# Patient Record
Sex: FEMALE | Race: BLACK OR AFRICAN AMERICAN | NOT HISPANIC OR LATINO | Employment: FULL TIME | URBAN - METROPOLITAN AREA
[De-identification: names, ages, dates, MRNs, and addresses within clinical notes are randomized per-mention and may not be internally consistent; named-entity substitution may affect disease eponyms.]

---

## 2020-02-24 PROBLEM — M54.6 ACUTE RIGHT-SIDED THORACIC BACK PAIN: Status: ACTIVE | Noted: 2020-02-24

## 2020-02-24 PROBLEM — D64.9 SYMPTOMATIC ANEMIA: Status: ACTIVE | Noted: 2020-02-24

## 2020-02-24 PROBLEM — R80.0 ISOLATED PROTEINURIA: Status: ACTIVE | Noted: 2020-02-24

## 2020-02-24 PROBLEM — L98.9 GROUPED SKIN LESIONS: Status: ACTIVE | Noted: 2020-02-24

## 2020-03-05 PROBLEM — R76.8 POSITIVE ANA (ANTINUCLEAR ANTIBODY): Status: ACTIVE | Noted: 2020-03-05

## 2020-03-05 PROBLEM — E66.9 OBESITY (BMI 30.0-34.9): Status: ACTIVE | Noted: 2020-03-05

## 2020-03-05 PROBLEM — E55.9 VITAMIN D DEFICIENCY: Status: ACTIVE | Noted: 2020-03-05

## 2020-03-05 PROBLEM — E88.09 HYPOALBUMINEMIA: Status: ACTIVE | Noted: 2020-03-05

## 2020-03-05 PROBLEM — M32.14 LUPUS NEPHRITIS: Status: ACTIVE | Noted: 2020-03-05

## 2020-03-05 PROBLEM — E87.6 HYPOKALEMIA: Status: ACTIVE | Noted: 2020-03-05

## 2020-03-05 PROBLEM — R80.9 NEPHROTIC RANGE PROTEINURIA: Status: ACTIVE | Noted: 2020-02-24

## 2020-03-05 PROBLEM — N17.9 AKI (ACUTE KIDNEY INJURY): Status: ACTIVE | Noted: 2020-03-05

## 2020-03-05 PROBLEM — E66.811 OBESITY (BMI 30.0-34.9): Status: ACTIVE | Noted: 2020-03-05

## 2020-03-05 PROBLEM — M32.14 LUPUS NEPHRITIS: Status: RESOLVED | Noted: 2020-03-05 | Resolved: 2020-03-05

## 2020-03-05 PROBLEM — I73.00 RAYNAUD'S PHENOMENON: Status: ACTIVE | Noted: 2020-03-05

## 2020-03-07 PROBLEM — E88.09 HYPOALBUMINEMIA: Status: RESOLVED | Noted: 2020-03-05 | Resolved: 2020-03-07

## 2020-03-07 PROBLEM — N17.9 AKI (ACUTE KIDNEY INJURY): Status: RESOLVED | Noted: 2020-03-05 | Resolved: 2020-03-07

## 2020-03-08 PROBLEM — R59.0 CERVICAL ADENOPATHY: Status: ACTIVE | Noted: 2020-03-08

## 2020-09-22 PROBLEM — E66.01 CLASS 3 SEVERE OBESITY DUE TO EXCESS CALORIES WITH BODY MASS INDEX (BMI) OF 40.0 TO 44.9 IN ADULT: Status: ACTIVE | Noted: 2020-03-05

## 2020-09-22 PROBLEM — E66.813 CLASS 3 SEVERE OBESITY DUE TO EXCESS CALORIES WITH BODY MASS INDEX (BMI) OF 40.0 TO 44.9 IN ADULT: Status: ACTIVE | Noted: 2020-03-05

## 2020-12-13 ENCOUNTER — SPECIALTY PHARMACY (OUTPATIENT)
Dept: PHARMACY | Facility: CLINIC | Age: 35
End: 2020-12-13

## 2020-12-23 NOTE — TELEPHONE ENCOUNTER
Informed Patient that [facility] received prescription for Benlysta and prior authorization is approved. [facility] will be back in touch once after completing a benefits investigation.

## 2021-01-12 NOTE — TELEPHONE ENCOUNTER
Specialty Pharmacy - Initial Clinical Assessment  Specialty Pharmacy - Medication/Referral Authorization    Specialty Medication Orders Linked to Encounter      Most Recent Value   Medication #1  belimumab (BENLYSTA) 200 mg/mL Syrg (Order#[id number], Rx#)        Sisi Bartlett is a 35 y.o. female, who is followed by the specialty pharmacy service for management and education.    Recent Encounters     Date Type Provider Description    12/13/2020 Specialty Pharmacy Delia Cota, Adonis Initial Clinical Assessment; Referral Authorization        Clinical call attempts since last clinical assessment   No call attempts found.     Today she received education before her first fill with [facility].    Current Outpatient Medications   Medication Sig    apixaban (ELIQUIS) 2.5 mg Tab Take 1 tablet (2.5 mg total) by mouth 2 (two) times daily.    atorvastatin (LIPITOR) 40 MG tablet Take 1 tablet (40 mg total) by mouth once daily.    belimumab (BENLYSTA) 200 mg/mL Syrg Inject 1 mL (200 mg total) into the skin every 7 days.    ergocalciferol (ERGOCALCIFEROL) 50,000 unit Cap Take 1 capsule (50,000 Units total) by mouth every 7 days.    ferrous sulfate (FEOSOL) 325 mg (65 mg iron) Tab tablet Take 1 tablet (325 mg total) by mouth 2 (two) times daily.    furosemide (LASIX) 20 MG tablet Take 1 tablet (20 mg total) by mouth daily as needed.    gabapentin (NEURONTIN) 300 MG capsule Take 1 capsule (300 mg total) by mouth every evening.    hydrOXYchloroQUINE (PLAQUENIL) 200 mg tablet Take 1 tablet (200 mg total) by mouth 2 (two) times daily.    iron,carb/vit C/vit B12/folic (IRON 100 PLUS ORAL) Take by mouth.    lisinopriL 10 MG tablet Take 1 tablet (10 mg total) by mouth once daily.    mycophenolate (CELLCEPT) 500 mg Tab Take 3 tablets (1,500 mg total) by mouth 2 (two) times daily.    pantoprazole (PROTONIX) 40 MG tablet Take 1 tablet (40 mg total) by mouth once daily.    polyethylene glycol  (GLYCOLAX) 17 gram PwPk Take 17 g by mouth daily as needed.    polyethylene glycol (GLYCOLAX) 17 gram PwPk Take 17 g by mouth once daily.    predniSONE (DELTASONE) 20 MG tablet Take 2 tablets (40 mg total) by mouth once daily.    simethicone (MYLICON) 125 MG chewable tablet Take 1 tablet (125 mg total) by mouth every 6 (six) hours as needed for Flatulence.    varicella-zoster gE-AS01B, PF, (SHINGRIX, PF,) 50 mcg/0.5 mL injection Inject 0.5 mLs into the muscle in 2 dose series at Day 0 and then next dose within 6 months   Last reviewed on 1/12/2021 10:30 AM by Alex Mcpherson, PharmD    Review of patient's allergies indicates:  No Known AllergiesLast reviewed on  1/12/2021 10:29 AM by Alex Mcpherson    Drug Interactions    Drug interactions evaluated: yes  Clinically relevant drug interactions identified: no  Provided the patient with educational material regarding drug interactions: not applicable           Assessment Questions - Documented Responses      Most Recent Value   Assessment   Medication Reconciliation completed for patient  Yes   During the past 4 weeks, has patient missed any activities due to condition or medication?  No   During the past 4 weeks, did patient have any of the following urgent care visits?  None   Goals of Therapy Status  Discussed (new start)   Welcome packet contents reviewed and discussed with patient?  Yes   Assesment completed?  Yes   Plan  Therapy being initiated   Do you need to open a clinical intervention (i-vent)?  No   Do you want to schedule first shipment?  Yes   Medication #1 Assessment Info   Patient status  New medication, New to [facility]   Is this medication appropriate for the patient?  Yes   Is this medication effective?  Not yet started        Refill Questions - Documented Responses      Most Recent Value   Relationship to patient of person spoken to?  Self   HIPAA/medical authority confirmed?  Yes   Can the patient store medication/sharps container properly (at the  "correct temperature, away from children/pets, etc.)?  Yes   Can the patient call emergency services (911) in the event of an emergency?  Yes   Does the patient have any concerns or questions about taking or administering this medication as prescribed?  No   How many doses does the patient have on hand?  0   How many days does the patient report on hand quantity will last?  1   During the past 4 weeks, has patient missed any activities due to condition or medication?  No   During the past 4 weeks, did patient have any of the following urgent care visits?  None   How will the patient receive the medication?  Mail   When does the patient need to receive the medication?  01/13/21   Shipping Address  Home   Address in [facility] confirmed and updated if neccessary?  Yes   Expected Copay ($)  0   Is the patient able to afford the medication copay?  Yes   Payment Method  zero copay   Days supply of Refill  28   Refill activity completed?  Yes   Refill activity plan  Refill scheduled   Shipment/Pickup Date:  01/12/21          Objective    She has a past medical history of Childhood asthma, Hypoalbuminemia (3/5/2020), Hypokalemia, Iron deficiency anemia, Nephrotic range proteinuria, Nephrotic syndrome, Obesity, Class III, BMI 40-49.9 (morbid obesity), Positive HEAVENLY (antinuclear antibody), SLE (systemic lupus erythematosus), and Vitamin D deficiency.    Tried/failed medications: Cellcept, Prednisone, and Plaquenil    BP Readings from Last 4 Encounters:   12/10/20 123/75   09/22/20 121/72   07/30/20 (!) 107/55   06/18/20 129/67     Ht Readings from Last 4 Encounters:   12/10/20 5' 7" (1.702 m)   09/22/20 5' 7" (1.702 m)   07/30/20 5' 7" (1.702 m)   06/18/20 5' 7" (1.702 m)     Wt Readings from Last 4 Encounters:   12/10/20 117 kg (258 lb)   09/22/20 120.4 kg (265 lb 6.4 oz)   07/30/20 113.4 kg (250 lb)   06/18/20 110.3 kg (243 lb 3.2 oz)     Recent Labs   Lab Result Units 12/10/20  1022   Creatinine mg/dL 0.61   ALT U/L " 30   AST U/L 25     The goals of prescribed drug therapy management include:  · Supporting patient to meet the prescriber's medical treatment objectives  · Improving or maintaining quality of life  · Maintaining optimal therapy adherence  · Minimizing and managing side effects      Goals of Therapy Status: Discussed (new start)    Assessment/Plan  Patient plans to start therapy on 21      Indication, dosage, appropriateness, effectiveness, safety and convenience of her specialty medication(s) were reviewed today.     Patient Counseling    Counseled the patient on the following: doses and administration discussed, safe handling, storage, and disposal discussed, possible adverse effects and management discussed, possible drug and prescription drug interactions discussed, possible drug and OTC drug and food interactions discussed, lab monitoring and follow-up discussed, therapeutic rationale discussed, cost of medications and cost implications discussed, adherence and missed doses discussed, pharmacy contact information discussed        and address verified. Patient confirmed readiness to begin therapy and will start on  after having the first injection with a nurse from her [facility]. Shipment arranged in [facility]. Patient denies any SLE symptoms with her current regimen, but she is experiencing several side effects related to Cellcept and long term Prednisone. She hopes to be able to taper off other medications if Benlysta works. No missed work days, no missed planned events, and no recent trips to urgent care/ED. Counseled patient on administration (including aspetic technique and rotating injection sites) disposal, goals of therapy, common/serious side effects, how to handle side effects, and how to handle missed doses. Reviewed avoiding live vaccines and sharps container disposal. Recommended holding doses if sick and reporting any missed doses or new medications to [facility].  Stressed the importance of routine lab  work and adherence to dosing schedule. Patient plans to utilize a calendar to keep track of dosing days. Reviewed allergies/med list; no changes. No DDIs. Answered patient's questions and concerns. [facility] to call for refills.     Tasks added this encounter   2/2/2021 - Refill Call (Auto Added)  4/5/2021 - Clinical - Follow Up Assesement (90 day)   Tasks due within next 3 months   No tasks due.     Alex Mcpherson, PharmD  [facility]  [address] [place] [id number]  Phone: [phone]  Fax: [phone]

## 2021-02-03 ENCOUNTER — SPECIALTY PHARMACY (OUTPATIENT)
Dept: PHARMACY | Facility: CLINIC | Age: 36
End: 2021-02-03

## 2021-02-25 ENCOUNTER — SPECIALTY PHARMACY (OUTPATIENT)
Dept: PHARMACY | Facility: CLINIC | Age: 36
End: 2021-02-25

## 2021-03-06 ENCOUNTER — PATIENT MESSAGE (OUTPATIENT)
Dept: PHARMACY | Facility: CLINIC | Age: 36
End: 2021-03-06

## 2021-04-01 ENCOUNTER — SPECIALTY PHARMACY (OUTPATIENT)
Dept: PHARMACY | Facility: CLINIC | Age: 36
End: 2021-04-01

## 2021-04-20 PROBLEM — D63.1 ANEMIA OF CHRONIC RENAL FAILURE, STAGE 2 (MILD): Status: ACTIVE | Noted: 2021-04-20

## 2021-04-20 PROBLEM — N18.2 ANEMIA OF CHRONIC RENAL FAILURE, STAGE 2 (MILD): Status: ACTIVE | Noted: 2021-04-20

## 2021-04-20 PROBLEM — N18.2 CKD (CHRONIC KIDNEY DISEASE) STAGE 2, GFR 60-89 ML/MIN: Status: ACTIVE | Noted: 2021-04-20

## 2021-04-29 ENCOUNTER — SPECIALTY PHARMACY (OUTPATIENT)
Dept: PHARMACY | Facility: CLINIC | Age: 36
End: 2021-04-29

## 2021-06-01 ENCOUNTER — SPECIALTY PHARMACY (OUTPATIENT)
Dept: PHARMACY | Facility: CLINIC | Age: 36
End: 2021-06-01

## 2021-06-25 ENCOUNTER — SPECIALTY PHARMACY (OUTPATIENT)
Dept: PHARMACY | Facility: CLINIC | Age: 36
End: 2021-06-25

## 2021-07-19 ENCOUNTER — SPECIALTY PHARMACY (OUTPATIENT)
Dept: PHARMACY | Facility: CLINIC | Age: 36
End: 2021-07-19

## 2021-07-21 ENCOUNTER — SPECIALTY PHARMACY (OUTPATIENT)
Dept: PHARMACY | Facility: CLINIC | Age: 36
End: 2021-07-21

## 2021-07-29 PROBLEM — H40.1132 PRIMARY OPEN ANGLE GLAUCOMA (POAG) OF BOTH EYES, MODERATE STAGE: Status: ACTIVE | Noted: 2021-07-29

## 2021-07-29 PROBLEM — Z79.899 LONG-TERM USE OF PLAQUENIL: Status: ACTIVE | Noted: 2021-07-29

## 2021-08-18 ENCOUNTER — SPECIALTY PHARMACY (OUTPATIENT)
Dept: PHARMACY | Facility: CLINIC | Age: 36
End: 2021-08-18

## 2021-09-17 ENCOUNTER — SPECIALTY PHARMACY (OUTPATIENT)
Dept: PHARMACY | Facility: CLINIC | Age: 36
End: 2021-09-17

## 2021-10-11 ENCOUNTER — SPECIALTY PHARMACY (OUTPATIENT)
Dept: PHARMACY | Facility: CLINIC | Age: 36
End: 2021-10-11

## 2021-11-11 ENCOUNTER — SPECIALTY PHARMACY (OUTPATIENT)
Dept: PHARMACY | Facility: CLINIC | Age: 36
End: 2021-11-11

## 2022-08-19 ENCOUNTER — SPECIALTY PHARMACY (OUTPATIENT)
Dept: PHARMACY | Facility: CLINIC | Age: 37
End: 2022-08-19

## 2022-08-19 ENCOUNTER — PATIENT MESSAGE (OUTPATIENT)
Dept: PHARMACY | Facility: CLINIC | Age: 37
End: 2022-08-19

## 2022-08-19 DIAGNOSIS — M32.14 LUPUS NEPHRITIS, ISN/RPS CLASS V: Primary | ICD-10-CM

## 2022-08-19 NOTE — TELEPHONE ENCOUNTER
Viviana, this is Ivone Fierro with [facility].  We are working on your prescription that your doctor has sent us. We will be working with your insurance to get this approved for you. We will be calling you along the way with updates on your medication.  If you have any questions, you can reach us at [phone].    Welcome call outcome: No answer/Unable to leave voicemail. Mailbox full.

## 2022-08-19 NOTE — TELEPHONE ENCOUNTER
PA submitted via Epic [facility]. [id number].  PA approved for BENLYSTA INJ 200MG/MLfrom 8/19/22 through 08/19/2023.    Test claim rejects. Pt must fill at [facility], [phone].     Will route rx to [facility] and close out of [facility].    Outgoing call to pt to inform her that she must fill at [facility]. No answer, no option to leave voicemail. Sent my chart message.

## 2024-11-19 ENCOUNTER — PATIENT MESSAGE (OUTPATIENT)
Dept: GASTROENTEROLOGY | Facility: CLINIC | Age: 39
End: 2024-11-19